# Patient Record
Sex: FEMALE | Race: WHITE | Employment: PART TIME | URBAN - METROPOLITAN AREA
[De-identification: names, ages, dates, MRNs, and addresses within clinical notes are randomized per-mention and may not be internally consistent; named-entity substitution may affect disease eponyms.]

---

## 2020-06-03 ENCOUNTER — OFFICE VISIT (OUTPATIENT)
Dept: ORTHOPEDIC SURGERY | Age: 60
End: 2020-06-03
Payer: COMMERCIAL

## 2020-06-03 VITALS — TEMPERATURE: 98.2 F | HEIGHT: 64 IN | BODY MASS INDEX: 34.15 KG/M2 | WEIGHT: 200 LBS

## 2020-06-03 PROCEDURE — 99203 OFFICE O/P NEW LOW 30 MIN: CPT | Performed by: ORTHOPAEDIC SURGERY

## 2020-06-03 NOTE — PROGRESS NOTES
Chief Complaint    Shoulder Problem (right shoulder )      History of Present Illness:  Anahi Bagley is a 61 y.o. female. She is here today for evaluation of her right shoulder. She is right-hand dominant. She injured her right shoulder about 6 weeks ago when she had a fall and landed on her right shoulder. She has had continuous right shoulder pain as well as weakness with moving her arm away from her body as well as pain at nighttime. She denies any past history of injury to this right shoulder prior to this most recent injury. She denies radicular pain. Denies numbness or tingling. Medical History:  Patient's medications, allergies, past medical, surgical, social and family histories were reviewed and updated as appropriate. Review of Systems:  Pertinent items are noted in HPI  Review of systems reviewed from Patient History Form dated on 6/3/20 and available in the patient's chart under the Media tab. Vital Signs:  Temp 98.2 °F (36.8 °C)   Ht 5' 3.5\" (1.613 m)   Wt 200 lb (90.7 kg)   BMI 34.87 kg/m²     General Exam:   Constitutional: Patient is adequately groomed with no evidence of malnutrition  DTRs: Deep tendon reflexes are intact  Mental Status: The patient is oriented to time, place and person. The patient's mood and affect are appropriate. Shoulder Examination:    Inspection: No significant swelling erythema note about the right shoulder today    Palpation: No tenderness today over the [facility] joint or over the bicipital groove    Range of Motion: Active forward elevation is 140 degrees. Passively we can get her to 170 degrees with pain. External rotation is 70 degrees once again with pain    Strength: She does have weakness with isolated supraspinatus testing. Strength is 4+ out of 5 with resisted external and internal rotation    Special Tests: She does have positive drop arm exam.  Pain weakness both with speeds exam as well as [place]'s active compression testing.   Positive

## 2020-06-17 ENCOUNTER — OFFICE VISIT (OUTPATIENT)
Dept: ORTHOPEDIC SURGERY | Age: 60
End: 2020-06-17
Payer: COMMERCIAL

## 2020-06-17 PROCEDURE — 99213 OFFICE O/P EST LOW 20 MIN: CPT | Performed by: ORTHOPAEDIC SURGERY

## 2020-06-17 NOTE — PROGRESS NOTES
Chief Complaint    Results (MRI reuslts right humerus)      History of Present Illness:  Mccoy Duverney is a 61 y.o. female. She is here today for follow-up for her right shoulder. She did have an MRI and is here today for the results. Does continue to have pain within the right shoulder       Medical History:  Patient's medications, allergies, past medical, surgical, social and family histories were reviewed and updated as appropriate. Review of Systems:  Pertinent items are noted in HPI  Review of systems reviewed from Patient History Form dated on 6/17/20 and available in the patient's chart under the Media tab. Vital Signs: There were no vitals taken for this visit. General Exam:   Constitutional: Patient is adequately groomed with no evidence of malnutrition  DTRs: Deep tendon reflexes are intact  Mental Status: The patient is oriented to time, place and person. The patient's mood and affect are appropriate. Shoulder Examination:    Inspection: No significant swelling erythema note about the right shoulder today     Palpation: No tenderness today over the [facility] joint or over the bicipital groove     Range of Motion: Active forward elevation is 140 degrees. Passively we can get her to 170 degrees with pain. External rotation is 70 degrees once again with pain     Strength: She does have weakness with isolated supraspinatus testing. Strength is 4+ out of 5 with resisted external and internal rotation     Special Tests: She does have positive drop arm exam.  Pain weakness both with speeds exam as well as [place]'s active compression testing. Positive [place]er and [place] impingement test     Skin: There are no rashes, ulcerations or lesions.     Gait: Normal  Additional Comments:       Additional Examinations:         Left Upper Extremity: Examination of the left upper extremity does not show any tenderness, deformity or injury. Range of motion is unremarkable. There is no gross instability.